# Patient Record
Sex: MALE | Race: WHITE | NOT HISPANIC OR LATINO | URBAN - METROPOLITAN AREA
[De-identification: names, ages, dates, MRNs, and addresses within clinical notes are randomized per-mention and may not be internally consistent; named-entity substitution may affect disease eponyms.]

---

## 2018-11-08 ENCOUNTER — NURSE TRIAGE (OUTPATIENT)
Dept: PHYSICAL THERAPY | Facility: OTHER | Age: 43
End: 2018-11-08

## 2018-11-08 DIAGNOSIS — M54.6 CHRONIC BILATERAL THORACIC BACK PAIN: Primary | ICD-10-CM

## 2018-11-08 DIAGNOSIS — M54.2 NECK PAIN: ICD-10-CM

## 2018-11-08 DIAGNOSIS — G89.29 CHRONIC BILATERAL THORACIC BACK PAIN: Primary | ICD-10-CM
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
## 2018-11-13 ENCOUNTER — EVALUATION (OUTPATIENT)
Dept: PHYSICAL THERAPY | Facility: CLINIC | Age: 43
End: 2018-11-13
Payer: COMMERCIAL

## 2018-11-13 DIAGNOSIS — G89.29 CHRONIC BILATERAL THORACIC BACK PAIN: Primary | ICD-10-CM

## 2018-11-13 DIAGNOSIS — M54.6 CHRONIC BILATERAL THORACIC BACK PAIN: Primary | ICD-10-CM

## 2018-11-13 DIAGNOSIS — M54.2 NECK PAIN: ICD-10-CM

## 2018-11-13 PROCEDURE — 97162 PT EVAL MOD COMPLEX 30 MIN: CPT | Performed by: PHYSICAL THERAPIST

## 2018-11-13 PROCEDURE — 97140 MANUAL THERAPY 1/> REGIONS: CPT | Performed by: PHYSICAL THERAPIST

## 2018-11-13 PROCEDURE — G8991 OTHER PT/OT GOAL STATUS: HCPCS | Performed by: PHYSICAL THERAPIST

## 2018-11-13 PROCEDURE — G8990 OTHER PT/OT CURRENT STATUS: HCPCS | Performed by: PHYSICAL THERAPIST

## 2018-11-13 RX ORDER — VENLAFAXINE HYDROCHLORIDE 150 MG/1
150 CAPSULE, EXTENDED RELEASE ORAL DAILY
COMMUNITY
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
## 2018-11-15 ENCOUNTER — OFFICE VISIT (OUTPATIENT)
Dept: PHYSICAL THERAPY | Facility: CLINIC | Age: 43
End: 2018-11-15
Payer: COMMERCIAL

## 2018-11-15 DIAGNOSIS — M54.6 CHRONIC BILATERAL THORACIC BACK PAIN: Primary | ICD-10-CM

## 2018-11-15 DIAGNOSIS — M54.2 NECK PAIN: ICD-10-CM

## 2018-11-15 DIAGNOSIS — G89.29 CHRONIC BILATERAL THORACIC BACK PAIN: Primary | ICD-10-CM

## 2018-11-15 PROCEDURE — 97140 MANUAL THERAPY 1/> REGIONS: CPT | Performed by: PHYSICAL THERAPIST

## 2018-11-15 PROCEDURE — 97110 THERAPEUTIC EXERCISES: CPT | Performed by: PHYSICAL THERAPIST

## 2018-11-15 PROCEDURE — 97010 HOT OR COLD PACKS THERAPY: CPT | Performed by: PHYSICAL THERAPIST

## 2018-11-15 NOTE — PROGRESS NOTES
Daily Note     Today's date: 11/15/2018  Patient name: Raphael Boston  : 1975  MRN: [id number]  Referring provider: Tayo Kern MD  Dx:   Encounter Diagnosis     ICD-10-CM    1  Chronic bilateral thoracic back pain M54 6     G89 29    2  Neck pain M54 2                   Subjective: Pt states no adverse effects to IE, reports compliance with HEP  Objective: See treatment diary below  Assessment: Tolerated treatment well  Good tolerance to progression of TE as noted, patient instructed to progress home program through weekend  Cont'd restriction in right scalene with significant T1/right first rib hypomobility noted  Patient would benefit from continued PT  Plan: Continue per plan of care         Precautions: anxiety    Daily Treatment Diary     Manual  11/13 11/15           R scalene STM    R first rib mobs    R C5/6 rotation mobs    R C1 shear correction 10' 25'                                                                    Exercise Diary  11/13 11/15           Thoracic ext over 1/2 pillow 2' f/b LTR :05x10 each 2' f/b LTR :05x10 each           Pivot prone NV :10x10           Foam roller series NV 1' each           t-band LT/ER NV NV           Thoracic ext stretch EOT NV NV           First ribs mobs with towel  NV                                                                                                                                                                                                     Modalities  11/13 11/15           MHP pre-tx to cervical spine NV 10'                                       HEP: thoracic ext over 1/2 pillow f/b LTR

## 2018-11-20 ENCOUNTER — OFFICE VISIT (OUTPATIENT)
Dept: PHYSICAL THERAPY | Facility: CLINIC | Age: 43
End: 2018-11-20
Payer: COMMERCIAL

## 2018-11-20 DIAGNOSIS — M54.2 NECK PAIN: ICD-10-CM

## 2018-11-20 DIAGNOSIS — M54.6 CHRONIC BILATERAL THORACIC BACK PAIN: Primary | ICD-10-CM

## 2018-11-20 DIAGNOSIS — G89.29 CHRONIC BILATERAL THORACIC BACK PAIN: Primary | ICD-10-CM

## 2018-11-20 PROCEDURE — 97110 THERAPEUTIC EXERCISES: CPT | Performed by: PHYSICAL THERAPIST

## 2018-11-20 PROCEDURE — 97010 HOT OR COLD PACKS THERAPY: CPT | Performed by: PHYSICAL THERAPIST

## 2018-11-20 PROCEDURE — 97140 MANUAL THERAPY 1/> REGIONS: CPT | Performed by: PHYSICAL THERAPIST

## 2018-11-20 NOTE — PROGRESS NOTES
Daily Note     Today's date: 2018  Patient name: Carola Mancia  : 1975  MRN: [id number]  Referring provider: Rajni Cooney MD  Dx:   Encounter Diagnosis     ICD-10-CM    1  Chronic bilateral thoracic back pain M54 6     G89 29    2  Neck pain M54 2                   Subjective: Pt states he had a few days of symptom relief following previous treatment  Reports cont'd compliance with HEP  Objective: See treatment diary below  Assessment: Tolerated treatment well  Good tolerance to progression of TE as noted, patient was given an updated written HEP this session  Cont'd restriction in right scalene with significant T1/right first rib hypomobility noted, improved following manuals  (+) response to treatment interventions  Patient would benefit from continued PT  Plan: Continue per plan of care         Precautions: anxiety    Daily Treatment Diary     Manual  11/13 11/15 11/20          R scalene STM    R first rib mobs    R C5/6 rotation mobs    R C1 shear correction 10' 25'  25'                                                                   Exercise Diary  11/13 11/15 11/20          Thoracic ext over 1/2 pillow 2' f/b LTR :05x10 each 2' f/b LTR :05x10 each 2' f/b LTR :05x10 each          Pivot prone NV :10x10 :10x10          Foam roller series NV 1' each 1' each          t-band LT/ER NV NV           Thoracic ext stretch EOT NV NV NV          First ribs mobs with towel  NV x10 flex x10 abduction          Scalene stretch with towel   :10x10 on R                                                                                                                                                                                       Modalities  11/13 11/15 11/20          MHP pre-tx to cervical spine NV 10' 10'                                      HEP: thoracic ext over 1/2 pillow f/b LTR

## 2018-11-26 ENCOUNTER — OFFICE VISIT (OUTPATIENT)
Dept: PHYSICAL THERAPY | Facility: CLINIC | Age: 43
End: 2018-11-26
Payer: COMMERCIAL

## 2018-11-26 DIAGNOSIS — M54.6 CHRONIC BILATERAL THORACIC BACK PAIN: Primary | ICD-10-CM

## 2018-11-26 DIAGNOSIS — M54.2 NECK PAIN: ICD-10-CM

## 2018-11-26 DIAGNOSIS — G89.29 CHRONIC BILATERAL THORACIC BACK PAIN: Primary | ICD-10-CM

## 2018-11-26 PROCEDURE — 97110 THERAPEUTIC EXERCISES: CPT | Performed by: PHYSICAL THERAPIST

## 2018-11-26 PROCEDURE — 97140 MANUAL THERAPY 1/> REGIONS: CPT | Performed by: PHYSICAL THERAPIST

## 2018-11-26 PROCEDURE — 97010 HOT OR COLD PACKS THERAPY: CPT | Performed by: PHYSICAL THERAPIST

## 2018-11-26 NOTE — PROGRESS NOTES
Daily Note     Today's date: 2018  Patient name: Stephanie Coreas  : 1975  MRN: [id number]  Referring provider: Lalo Noriega MD  Dx:   Encounter Diagnosis     ICD-10-CM    1  Chronic bilateral thoracic back pain M54 6     G89 29    2  Neck pain M54 2                   Subjective: Pt states he continues to notice an improvement in cervical symptoms since beginning PT  Reports cont'd compliance with HEP and reports he continues to work on his posture  Objective: See treatment diary below  Assessment: Tolerated treatment well  Cont'd restriction in right scalene with significant T1/right first rib hypomobility noted, improved following manuals and gradually improved overall  Good tolerance to ex program, min cueing for proper technique  (+) response to treatment interventions  Patient would benefit from continued PT  Plan: Continue per plan of care         Precautions: anxiety    Daily Treatment Diary     Manual  11/13 11/15 11/20 11/26         R scalene STM    R first rib mobs    R C5/6 rotation mobs    R C1 shear correction 10' 25'  25'  25'                                                                 Exercise Diary  11/13 11/15 11/20 11/26         Thoracic ext over 1/2 pillow 2' f/b LTR :05x10 each 2' f/b LTR :05x10 each 2' f/b LTR :05x10 each 2' f/b LTR :05x10 each         Pivot prone NV :10x10 :10x10 :10x10         Foam roller series NV 1' each 1' each 1' each         t-band LT/ER NV NV  NV         Thoracic ext stretch EOT NV NV NV NV         First ribs mobs with towel  NV x10 flex x10 abduction x10 flex x10 abduction         Scalene stretch with towel   :10x10 on R :10x10 on R                                                                                                                                                                                      Modalities  11/13 11/15 11/20 11/26         MHP pre-tx to cervical spine NV 10' 10' 10' HEP: thoracic ext over 1/2 pillow f/b LTR

## 2018-11-29 ENCOUNTER — OFFICE VISIT (OUTPATIENT)
Dept: PHYSICAL THERAPY | Facility: CLINIC | Age: 43
End: 2018-11-29
Payer: COMMERCIAL

## 2018-11-29 DIAGNOSIS — M54.6 CHRONIC BILATERAL THORACIC BACK PAIN: Primary | ICD-10-CM

## 2018-11-29 DIAGNOSIS — G89.29 CHRONIC BILATERAL THORACIC BACK PAIN: Primary | ICD-10-CM

## 2018-11-29 DIAGNOSIS — M54.2 NECK PAIN: ICD-10-CM

## 2018-11-29 PROCEDURE — 97140 MANUAL THERAPY 1/> REGIONS: CPT | Performed by: PHYSICAL THERAPIST

## 2018-11-29 PROCEDURE — 97110 THERAPEUTIC EXERCISES: CPT | Performed by: PHYSICAL THERAPIST

## 2018-11-29 PROCEDURE — 97010 HOT OR COLD PACKS THERAPY: CPT | Performed by: PHYSICAL THERAPIST

## 2018-11-29 NOTE — PROGRESS NOTES
Daily Note     Today's date: 2018  Patient name: Lacey Jerez  : 1975  MRN: [id number]  Referring provider: William Wilkes MD  Dx:   Encounter Diagnosis     ICD-10-CM    1  Chronic bilateral thoracic back pain M54 6     G89 29    2  Neck pain M54 2                   Subjective: Pt states he attempted new sitting posture to improved tolerance  Continues to notice an improvement in cervical symptoms since beginning PT, reporting he felt around 80% improved following the first week but notices as he works throughout the day his symptoms continue to fluctuate  Objective: See treatment diary below  Assessment: Tolerated treatment well  Cont'd restriction in right scalene with significant T1/right first rib hypomobility noted, improved following manuals and gradually improved overall  Progressed TE this session to include postural strengthening, patient was given an updated written HEP and green t-band this session  (+) response to treatment interventions  Patient would benefit from continued PT  Plan: Continue per plan of care     Pt seeing chiropractor and massage therapist on 12/3/18     Precautions: anxiety    Daily Treatment Diary     Manual  11/13 11/15 11/20 11/26 11/29        R scalene STM    R first rib mobs    R C5/6 rotation mobs    R C1 shear correction 10' 25'  25'  25' 15'                                                                 Exercise Diary  11/13 11/15 11/20 11/26 11/29        Thoracic ext over 1/2 pillow 2' f/b LTR :05x10 each 2' f/b LTR :05x10 each 2' f/b LTR :05x10 each 2' f/b LTR :05x10 each 2' f/b LTR :05x10 each        Pivot prone NV :10x10 :10x10 :10x10 :10x10        Foam roller series NV 1' each 1' each 1' each 1' each        t-band rows     GTB :10x10        t-band LT/ER NV NV  NV GTB :10x10 each        Thoracic ext stretch EOT NV NV NV NV NV        First ribs mobs with towel  NV x10 flex x10 abduction x10 flex x10 abduction home        Scalene stretch with towel   :10x10 on R :10x10 on R home                                                                                                                                                                                     Modalities  11/13 11/15 11/20 11/26 11/29        MHP pre-tx to cervical spine NV 10' 10' 10' 10'                                    HEP: thoracic ext over 1/2 pillow f/b LTR

## 2018-12-03 ENCOUNTER — APPOINTMENT (OUTPATIENT)
Dept: PHYSICAL THERAPY | Facility: CLINIC | Age: 43
End: 2018-12-03
Payer: COMMERCIAL

## 2018-12-04 ENCOUNTER — OFFICE VISIT (OUTPATIENT)
Dept: PHYSICAL THERAPY | Facility: CLINIC | Age: 43
End: 2018-12-04
Payer: COMMERCIAL

## 2018-12-04 DIAGNOSIS — G89.29 CHRONIC BILATERAL THORACIC BACK PAIN: Primary | ICD-10-CM

## 2018-12-04 DIAGNOSIS — M54.2 NECK PAIN: ICD-10-CM

## 2018-12-04 DIAGNOSIS — M54.6 CHRONIC BILATERAL THORACIC BACK PAIN: Primary | ICD-10-CM

## 2018-12-04 PROCEDURE — 97010 HOT OR COLD PACKS THERAPY: CPT | Performed by: PHYSICAL THERAPIST

## 2018-12-04 PROCEDURE — 97110 THERAPEUTIC EXERCISES: CPT | Performed by: PHYSICAL THERAPIST

## 2018-12-04 PROCEDURE — 97140 MANUAL THERAPY 1/> REGIONS: CPT | Performed by: PHYSICAL THERAPIST

## 2018-12-04 NOTE — PROGRESS NOTES
Daily Note     Today's date: 2018  Patient name: Flora Martinez  : 1975  MRN: [id number]  Referring provider: Sarah Barth MD  Dx:   Encounter Diagnosis     ICD-10-CM    1  Chronic bilateral thoracic back pain M54 6     G89 29    2  Neck pain M54 2                   Subjective: Pt states improved tolerance to prolonged sitting with less symptoms present  He reports continued compliance with HEP  Objective: See treatment diary below  Assessment: Tolerated treatment well  Reducing restriction in right scalene and T1/right first rib hypomobility  Good tolerance to ex program as noted  (+) response to treatment interventions  Patient would benefit from continued PT  Plan: Continue per plan of care         Precautions: anxiety    Daily Treatment Diary     Manual  11/13 11/15 11/20 11/26 11/29 12/4       R scalene STM    R first rib mobs    R C5/6 rotation mobs    R C1 shear correction 10' 25'  25'  25' 15'  15'                                                               Exercise Diary  11/13 11/15 11/20 11/26 11/29 12/4       Thoracic ext over 1/2 pillow 2' f/b LTR :05x10 each 2' f/b LTR :05x10 each 2' f/b LTR :05x10 each 2' f/b LTR :05x10 each 2' f/b LTR :05x10 each 2' f/b LTR :05x10 each       Pivot prone NV :10x10 :10x10 :10x10 :10x10 :10x10       Foam roller series NV 1' each 1' each 1' each 1' each 1' each       t-band rows     GTB :10x10 GTB :10x10       t-band LT/ER NV NV  NV GTB :10x10 each GTB :10x10 each       Thoracic ext stretch EOT NV NV NV NV NV NV       First ribs mobs with towel  NV x10 flex x10 abduction x10 flex x10 abduction home home       Scalene stretch with towel   :10x10 on R :10x10 on R home home                                                                                                                                                                                    Modalities  11/13 11/15 11/20 11/26 11/29 12/4       MHP pre-tx to cervical spine NV 10' 10' 10' 10' 10'                                   HEP: thoracic ext over 1/2 pillow f/b LTR

## 2018-12-06 ENCOUNTER — OFFICE VISIT (OUTPATIENT)
Dept: PHYSICAL THERAPY | Facility: CLINIC | Age: 43
End: 2018-12-06
Payer: COMMERCIAL

## 2018-12-06 DIAGNOSIS — M54.6 CHRONIC BILATERAL THORACIC BACK PAIN: Primary | ICD-10-CM

## 2018-12-06 DIAGNOSIS — M54.2 NECK PAIN: ICD-10-CM

## 2018-12-06 DIAGNOSIS — G89.29 CHRONIC BILATERAL THORACIC BACK PAIN: Primary | ICD-10-CM

## 2018-12-06 PROCEDURE — 97140 MANUAL THERAPY 1/> REGIONS: CPT | Performed by: PHYSICAL THERAPIST

## 2018-12-06 PROCEDURE — 97110 THERAPEUTIC EXERCISES: CPT | Performed by: PHYSICAL THERAPIST

## 2018-12-06 PROCEDURE — 97010 HOT OR COLD PACKS THERAPY: CPT | Performed by: PHYSICAL THERAPIST

## 2018-12-06 NOTE — PROGRESS NOTES
Daily Note     Today's date: 2018  Patient name: Georgie Guerrero  : 1975  MRN: [id number]  Referring provider: Malinda Mckeon MD  Dx:   Encounter Diagnosis     ICD-10-CM    1  Chronic bilateral thoracic back pain M54 6     G89 29    2  Neck pain M54 2                   Subjective: Pt states no new complaints at pre-tx, reporting he continues to comply with HEP  States he continues to have "good and bad days "       Objective: See treatment diary below  Assessment: Tolerated treatment well  Reducing restriction in right scalene and T1/right first rib hypomobility  Good tolerance to ex program, continued mild fatigue with postural strengthening  (+) response to treatment interventions  Patient would benefit from continued PT  Plan: Continue per plan of care         Precautions: anxiety    Daily Treatment Diary     Manual  11/13 11/15 11/20 11/26 11/29 12/4 12/6      R scalene STM    R first rib mobs    R C5/6 rotation mobs    R C1 shear correction 10' 25'  25'  25' 15'  15' 15'                                                              Exercise Diary  11/13 11/15 11/20 11/26 11/29 12/4 12/6      Thoracic ext over 12 pillow 2' f/b LTR :05x10 each 2' f/b LTR :05x10 each 2' f/b LTR :05x10 each 2' f/b LTR :05x10 each 2' f/b LTR :05x10 each 2' f/b LTR :05x10 each 2' f/b LTR :05x10 each      Pivot prone NV :10x10 :10x10 :10x10 :10x10 :10x10 :10x10      Foam roller series NV 1' each 1' each 1' each 1' each 1' each 1' each      t-band rows     GTB :10x10 GTB :10x10 GTB :10x10      t-band LT/ER NV NV  NV GTB :10x10 each GTB :10x10 each GTB :10x10 each      Thoracic ext stretch EOT NV NV NV NV NV NV NV      First ribs mobs with towel  NV x10 flex x10 abduction x10 flex x10 abduction home home home      Scalene stretch with towel   :10x10 on R :10x10 on R home home home Modalities  11/13 11/15 11/20 11/26 11/29 12/4 12/6      MHP pre-tx to cervical spine NV 10' 10' 10' 10' 10' 10'                                  HEP: thoracic ext over 1/2 pillow f/b LTR

## 2018-12-12 ENCOUNTER — APPOINTMENT (OUTPATIENT)
Dept: PHYSICAL THERAPY | Facility: CLINIC | Age: 43
End: 2018-12-12
Payer: COMMERCIAL

## 2018-12-13 ENCOUNTER — OFFICE VISIT (OUTPATIENT)
Dept: PHYSICAL THERAPY | Facility: CLINIC | Age: 43
End: 2018-12-13
Payer: COMMERCIAL

## 2018-12-13 DIAGNOSIS — M54.2 NECK PAIN: ICD-10-CM

## 2018-12-13 DIAGNOSIS — M54.6 CHRONIC BILATERAL THORACIC BACK PAIN: Primary | ICD-10-CM

## 2018-12-13 DIAGNOSIS — G89.29 CHRONIC BILATERAL THORACIC BACK PAIN: Primary | ICD-10-CM

## 2018-12-13 PROCEDURE — 97110 THERAPEUTIC EXERCISES: CPT | Performed by: PHYSICAL THERAPIST

## 2018-12-13 PROCEDURE — 97010 HOT OR COLD PACKS THERAPY: CPT | Performed by: PHYSICAL THERAPIST

## 2018-12-13 PROCEDURE — 97140 MANUAL THERAPY 1/> REGIONS: CPT | Performed by: PHYSICAL THERAPIST

## 2018-12-13 NOTE — PROGRESS NOTES
Daily Note     Today's date: 2018  Patient name: Cali Spence  : 1975  MRN: [id number]  Referring provider: John Cohn MD  Dx:   Encounter Diagnosis     ICD-10-CM    1  Chronic bilateral thoracic back pain M54 6     G89 29    2  Neck pain M54 2                   Subjective: Pt states no new complaints at pre-tx  Objective: See treatment diary below  Assessment: Tolerated treatment well  Reducing restriction in right scalene and T1/right first rib hypomobility  Good tolerance to ex program and progression  Patient would benefit from continued PT  Plan: Continue per plan of care         Precautions: anxiety    Daily Treatment Diary     Manual  11/13 11/15 11/20 11/26 11/29 12/4 12/6 12/13     R scalene STM    R first/second rib mobs    R C5/6 rotation mobs    R C1 shear correction 10' 25'  25'  25' 15'  15' 15' 15'                                                             Exercise Diary  11/13 11/15 11/20 11/26 11/29 12/4 12/6 12/13     Thoracic ext over 1/2 pillow 2' f/b LTR :05x10 each 2' f/b LTR :05x10 each 2' f/b LTR :05x10 each 2' f/b LTR :05x10 each 2' f/b LTR :05x10 each 2' f/b LTR :05x10 each 2' f/b LTR :05x10 each 2' f/b LTR :05x10 each     Pivot prone NV :10x10 :10x10 :10x10 :10x10 :10x10 :10x10 :10x10     Foam roller series NV 1' each 1' each 1' each 1' each 1' each 1' each 1' each     t-band rows     GTB :10x10 GTB :10x10 GTB :10x10 GTB :10x10     t-band LT/ER NV NV  NV GTB :10x10 each GTB :10x10 each GTB :10x10 each GTB :10x10 each     Thoracic ext stretch EOT NV NV NV NV NV NV NV :10x10     First ribs mobs with towel  NV x10 flex x10 abduction x10 flex x10 abduction home home home home     Scalene stretch with towel   :10x10 on R :10x10 on R home home home home                                                                                                                                                                                  Modalities  11/13 11/15 11/20 11/26 11/29 12/4 12/6 12/13     MHP pre-tx to cervical spine NV 10' 10' 10' 10' 10' 10' 10'                                 HEP: thoracic ext over 1/2 pillow f/b LTR

## 2018-12-19 ENCOUNTER — OFFICE VISIT (OUTPATIENT)
Dept: PHYSICAL THERAPY | Facility: CLINIC | Age: 43
End: 2018-12-19
Payer: COMMERCIAL

## 2018-12-19 DIAGNOSIS — M54.6 CHRONIC BILATERAL THORACIC BACK PAIN: Primary | ICD-10-CM

## 2018-12-19 DIAGNOSIS — G89.29 CHRONIC BILATERAL THORACIC BACK PAIN: Primary | ICD-10-CM

## 2018-12-19 DIAGNOSIS — M54.2 NECK PAIN: ICD-10-CM

## 2018-12-19 PROCEDURE — 97010 HOT OR COLD PACKS THERAPY: CPT | Performed by: PHYSICAL THERAPIST

## 2018-12-19 PROCEDURE — 97140 MANUAL THERAPY 1/> REGIONS: CPT | Performed by: PHYSICAL THERAPIST

## 2018-12-19 PROCEDURE — 97110 THERAPEUTIC EXERCISES: CPT | Performed by: PHYSICAL THERAPIST

## 2018-12-19 NOTE — PROGRESS NOTES
Daily Note     Today's date: 2018  Patient name: Candace Stein  : 1975  MRN: [id number]  Referring provider: Ade Trotter MD  Dx:   Encounter Diagnosis     ICD-10-CM    1  Chronic bilateral thoracic back pain M54 6     G89 29    2  Neck pain M54 2                   Subjective: Pt states symptoms continue to fluctuate, reporting he notices a change of workstation at work reduced symptoms due to better ergonomic set up  Objective: See treatment diary below  Assessment: Tolerated treatment well  Reducing restriction in right scalene  Continued T1/right first/second rib hypomobility  Good tolerance to ex program with no symptoms present post-tx  Patient would benefit from continued PT  Plan: Continue per plan of care         Precautions: anxiety    Daily Treatment Diary     Manual  11/13 11/15 11/20 11/26 11/29 12/4 12/6 12/13 12/19    R scalene STM    R first/second rib mobs    R C5/6 rotation mobs    R C1 shear correction 10' 25'  25'  25' 15'  15' 15' 15' 15'                                                             Exercise Diary  11/13 11/15 11/20 11/26 11/29 12/4 12/6 12/13 12/19    Thoracic ext over 1/2 pillow 2' f/b LTR :05x10 each 2' f/b LTR :05x10 each 2' f/b LTR :05x10 each 2' f/b LTR :05x10 each 2' f/b LTR :05x10 each 2' f/b LTR :05x10 each 2' f/b LTR :05x10 each 2' f/b LTR :05x10 each 3' f/b LTR :05x10 each    Pivot prone NV :10x10 :10x10 :10x10 :10x10 :10x10 :10x10 :10x10 :10x10    Foam roller series NV 1' each 1' each 1' each 1' each 1' each 1' each 1' each 1' each    t-band rows     GTB :10x10 GTB :10x10 GTB :10x10 GTB :10x10 GTB :10x10    t-band LT/ER NV NV  NV GTB :10x10 each GTB :10x10 each GTB :10x10 each GTB :10x10 each GTB :10x10 each    Thoracic ext stretch EOT NV NV NV NV NV NV NV :10x10 :10x10    First ribs mobs with towel  NV x10 flex x10 abduction x10 flex x10 abduction home home home home home    Scalene stretch with towel   :10x10 on R :10x10 on R home home home home home                                                                                                                                                                                 Modalities  11/13 11/15 11/20 11/26 11/29 12/4 12/6 12/13 12/19    MHP pre-tx to cervical spine NV 10' 10' 10' 10' 10' 10' 10' 10'                                 HEP: thoracic ext over 1/2 pillow f/b LTR

## 2018-12-26 ENCOUNTER — OFFICE VISIT (OUTPATIENT)
Dept: PHYSICAL THERAPY | Facility: CLINIC | Age: 43
End: 2018-12-26
Payer: COMMERCIAL

## 2018-12-26 DIAGNOSIS — M54.6 CHRONIC BILATERAL THORACIC BACK PAIN: Primary | ICD-10-CM

## 2018-12-26 DIAGNOSIS — M54.2 NECK PAIN: ICD-10-CM

## 2018-12-26 DIAGNOSIS — G89.29 CHRONIC BILATERAL THORACIC BACK PAIN: Primary | ICD-10-CM

## 2018-12-26 PROCEDURE — 97110 THERAPEUTIC EXERCISES: CPT | Performed by: PHYSICAL THERAPIST

## 2018-12-26 PROCEDURE — 97010 HOT OR COLD PACKS THERAPY: CPT | Performed by: PHYSICAL THERAPIST

## 2018-12-26 PROCEDURE — 97140 MANUAL THERAPY 1/> REGIONS: CPT | Performed by: PHYSICAL THERAPIST

## 2018-12-26 NOTE — PROGRESS NOTES
Daily Note     Today's date: 2018  Patient name: Liborio Kirby  : 1975  MRN: [id number]  Referring provider: Juan Jose Mitchell MD  Dx:   Encounter Diagnosis     ICD-10-CM    1  Chronic bilateral thoracic back pain M54 6     G89 29    2  Neck pain M54 2                   Subjective: Pt states a 50-60% improvement in symptoms since beginning PT, stating he has had less symptoms this week due to being off from work  Reports symptoms continue to "fluctuate", localized to left scapular region  Objective: See treatment diary below  Assessment: Tolerated treatment well  Progressed MT and TE as noted to address continued mid thoracic spine pain to good tolerance, patient demonstrating fatigue with prone scap stabilization exercises  Continued T1/right first/second rib hypomobility  Reduced symptoms noted post-tx  Patient would benefit from continued PT  Plan: Continue per plan of care         Precautions: anxiety    Daily Treatment Diary     Manual  11/13 11/15 11/20 11/26 11/29 12/4 12/6 12/13 12/19 12/26   R scalene STM    R first/second rib mobs    R C5/6 rotation mobs    R C1 shear correction    Prone thoracic PA's to L T6-7/rhomboid 10' 25'  25'  25' 15'  15' 15' 15' 15'  15'                                                           Exercise Diary  11/13 11/15 11/20 11/26 11/29 12/4 12/6 12/13 12/19 12/26   Thoracic ext over 1/ pillow 2' f/b LTR :05x10 each 2' f/b LTR :05x10 each 2' f/b LTR :05x10 each 2' f/b LTR :05x10 each 2' f/b LTR :05x10 each 2' f/b LTR :05x10 each 2' f/b LTR :05x10 each 2' f/b LTR :05x10 each 3' f/b LTR :05x10 each 3' f/b LTR :05x10 each (used foam roll this visit)   Pivot prone NV :10x10 :10x10 :10x10 :10x10 :10x10 :10x10 :10x10 :10x10 NP   Foam roller series NV 1' each 1' each 1' each 1' each 1' each 1' each 1' each 1' each NP   t-band rows     GTB :10x10 GTB :10x10 GTB :10x10 GTB :10x10 GTB :10x10 Held    t-band LT/ER NV NV  NV GTB :10x10 each GTB :10x10 each GTB :10x10 each GTB :10x10 each GTB :10x10 each Held   Prone I's, T's, W's          :[id number]   Thoracic ext stretch EOT NV NV NV NV NV NV NV :10x10 :10x10 NP   First ribs mobs with towel  NV x10 flex x10 abduction x10 flex x10 abduction home home home home home home   Scalene stretch with towel   :10x10 on R :10x10 on R home home home home home home   Self ball massage          5' (home NV)                                                                                                                                                                   Modalities  11/13 11/15 11/20 11/26 11/29 12/4 12/6 12/13 12/19 12/26   MHP pre-tx to cervical spine NV 10' 10' 10' 10' 10' 10' 10' 10'  10'                               HEP: thoracic ext over 1/2 pillow f/b LTR

## 2019-01-04 ENCOUNTER — OFFICE VISIT (OUTPATIENT)
Dept: PHYSICAL THERAPY | Facility: CLINIC | Age: 44
End: 2019-01-04
Payer: COMMERCIAL

## 2019-01-04 DIAGNOSIS — G89.29 CHRONIC BILATERAL THORACIC BACK PAIN: Primary | ICD-10-CM

## 2019-01-04 DIAGNOSIS — M54.2 NECK PAIN: ICD-10-CM

## 2019-01-04 DIAGNOSIS — M54.6 CHRONIC BILATERAL THORACIC BACK PAIN: Primary | ICD-10-CM

## 2019-01-04 PROCEDURE — 97010 HOT OR COLD PACKS THERAPY: CPT | Performed by: PHYSICAL THERAPIST

## 2019-01-04 PROCEDURE — 97140 MANUAL THERAPY 1/> REGIONS: CPT | Performed by: PHYSICAL THERAPIST

## 2019-01-04 PROCEDURE — 97110 THERAPEUTIC EXERCISES: CPT | Performed by: PHYSICAL THERAPIST

## 2019-01-04 NOTE — PROGRESS NOTES
Daily Note     Today's date: 2019  Patient name: Humphrey Romeo  : 1975  MRN: [id number]  Referring provider: Collette Major MD  Dx:   Encounter Diagnosis     ICD-10-CM    1  Chronic bilateral thoracic back pain M54 6     G89 29    2  Neck pain M54 2                   Subjective: Pt states continued intermittent symptoms over the last week or so  He reports he ordered a massage ball and foam roller off [employer] to perform exercises at home and noticed an improvement  Objective: See treatment diary below  Assessment: Tolerated treatment well  Good tolerance to MT addressing right sided cervical restrictions and left sided thoracic restrictions  Good tolerance to ex program as noted, requiring verbal cues for proper technique with scap stabilization ex  Reduced symptoms post-tx  Patient would benefit from continued PT  Plan: Continue per plan of care         Precautions: anxiety    Daily Treatment Diary     Manual  1/4 11/15 11/20 11/26 11/29 12/4 12/6 12/13 12/19 12/26   R scalene STM    R first/second rib mobs    R C5/6 rotation mobs    R C1 shear correction    Prone thoracic PA's to L T6-7/rhomboid 15' 25'  25'  25' 15'  15' 15' 15' 15'  15'                                                           Exercise Diary  1/4 11/15 11/20 11/26 11/29 12/4 12/6 12/13 12/19 12/26   Thoracic ext over  pillow 3' f/b LTR :05x10 each (used foam roll this visit) 2' f/b LTR :05x10 each 2' f/b LTR :05x10 each 2' f/b LTR :05x10 each 2' f/b LTR :05x10 each 2' f/b LTR :05x10 each 2' f/b LTR :05x10 each 2' f/b LTR :05x10 each 3' f/b LTR :05x10 each 3' f/b LTR :05x10 each (used foam roll this visit)   Pivot prone NP :10x10 :10x10 :10x10 :10x10 :10x10 :10x10 :10x10 :10x10 NP   Foam roller series 1' each 1' each 1' each 1' each 1' each 1' each 1' each 1' each 1' each NP   t-band rows held    GTB :10x10 GTB :10x10 GTB :10x10 GTB :10x10 GTB :10x10 Held    t-band LT/ER held NV  NV GTB :10x10 each GTB :10x10 each GTB :10x10 each GTB :10x10 each GTB :10x10 each Held   Prone I's, T's, W's :10x10 each         [id number]   Thoracic ext stretch EOT :10x10 NV NV NV NV NV NV :10x10 :10x10 NP   First ribs mobs with towel home NV x10 flex x10 abduction x10 flex x10 abduction home home home home home home   Scalene stretch with towel home  :10x10 on R :10x10 on R home home home home home home   Self ball massage home         5' (home NV)                                                                                                                                                                   Modalities  1/4 11/15 11/20 11/26 11/29 12/4 12/6 12/13 12/19 12/26   MHP pre-tx to cervical spine 10' 10' 10' 10' 10' 10' 10' 10' 10'  10'                               HEP: thoracic ext over 1/2 pillow f/b LTR

## 2019-01-09 ENCOUNTER — OFFICE VISIT (OUTPATIENT)
Dept: PHYSICAL THERAPY | Facility: CLINIC | Age: 44
End: 2019-01-09
Payer: COMMERCIAL

## 2019-01-09 DIAGNOSIS — G89.29 CHRONIC BILATERAL THORACIC BACK PAIN: Primary | ICD-10-CM

## 2019-01-09 DIAGNOSIS — M54.2 NECK PAIN: ICD-10-CM

## 2019-01-09 DIAGNOSIS — M54.6 CHRONIC BILATERAL THORACIC BACK PAIN: Primary | ICD-10-CM

## 2019-01-09 PROCEDURE — 97110 THERAPEUTIC EXERCISES: CPT | Performed by: PHYSICAL THERAPIST

## 2019-01-09 PROCEDURE — 97140 MANUAL THERAPY 1/> REGIONS: CPT | Performed by: PHYSICAL THERAPIST

## 2019-01-09 PROCEDURE — 97010 HOT OR COLD PACKS THERAPY: CPT | Performed by: PHYSICAL THERAPIST

## 2019-01-09 NOTE — PROGRESS NOTES
Daily Note     Today's date: 2019  Patient name: Yadi Graf  : 1975  MRN: [id number]  Referring provider: Natalie Hebert MD  Dx:   Encounter Diagnosis     ICD-10-CM    1  Chronic bilateral thoracic back pain M54 6     G89 29    2  Neck pain M54 2                   Subjective: Pt states he continues to have improvements in symptoms since beginning PT and has stayed compliant with HEP, trying to be more mindful of posturing  He reports work-related activity continues to aggravate symptoms  Objective: See treatment diary below  Assessment: Tolerated treatment well  Good tolerance to MT addressing right sided cervical restrictions and left sided thoracic restrictions  Cont'd hypomobility of right lower cervical vertebra  Good tolerance to ex program as noted, requiring verbal cues for proper technique with scap stabilization ex  Reduced symptoms reported post-tx  Patient would benefit from continued PT  Plan: Continue per plan of care         Precautions: anxiety    Daily Treatment Diary     Manual             R scalene STM    R first/second rib mobs    R C5/6 rotation mobs    R C1 shear correction    Prone thoracic PA's to L T6-7/rhomboid 15' 15'                                                                    Exercise Diary             Thoracic ext over 2 pillow 3' f/b LTR :05x10 each (used foam roll this visit) 3' f/b LTR :05x10 each (used foam roll this visit)           Pivot prone NP :10x10           Foam roller series 1' each 1' each           t-band rows held held           t-band LT/ER held held           Prone I's, T's, W's :10x10 each :10x10 each           Thoracic ext stretch EOT :10x10 :10x10           First ribs mobs with towel home home           Scalene stretch with towel home home           Self ball massage home home Modalities  1/4 1/9           MHP pre-tx to cervical spine 10' 10'                                       HEP: thoracic ext over 1/2 pillow f/b LTR

## 2019-01-16 ENCOUNTER — OFFICE VISIT (OUTPATIENT)
Dept: PHYSICAL THERAPY | Facility: CLINIC | Age: 44
End: 2019-01-16
Payer: COMMERCIAL

## 2019-01-16 DIAGNOSIS — M54.6 CHRONIC BILATERAL THORACIC BACK PAIN: Primary | ICD-10-CM

## 2019-01-16 DIAGNOSIS — M54.2 NECK PAIN: ICD-10-CM

## 2019-01-16 DIAGNOSIS — G89.29 CHRONIC BILATERAL THORACIC BACK PAIN: Primary | ICD-10-CM

## 2019-01-16 PROCEDURE — 97140 MANUAL THERAPY 1/> REGIONS: CPT | Performed by: PHYSICAL THERAPIST

## 2019-01-16 PROCEDURE — 97110 THERAPEUTIC EXERCISES: CPT | Performed by: PHYSICAL THERAPIST

## 2019-01-16 PROCEDURE — 97010 HOT OR COLD PACKS THERAPY: CPT | Performed by: PHYSICAL THERAPIST

## 2019-01-16 NOTE — PROGRESS NOTES
Daily Note     Today's date: 2019  Patient name: Minh Alberto  : 1975  MRN: [id number]  Referring provider: Lata Maloney MD  Dx:   Encounter Diagnosis     ICD-10-CM    1  Chronic bilateral thoracic back pain M54 6     G89 29    2  Neck pain M54 2                   Subjective: Pt states continued left sided cervical discomfort with prolonged sitting at work, improved since beginning PT  Objective: See treatment diary below  Assessment: Tolerated treatment well  Good tolerance to MT, cont'd hypomobility of right lower cervical vertebra and upper thoracic  Good tolerance to ex program focused on improving postural strength and spinal mobility  Reduced symptoms reported post-tx  Patient would benefit from continued PT  Plan: Continue per plan of care         Precautions: anxiety    Daily Treatment Diary     Manual            R scalene STM    R first/second rib mobs    R C5/6 rotation mobs    R C1 shear correction    Prone thoracic PA's to L T6-7/rhomboid 15' 15'  25'                                                                  Exercise Diary            Thoracic ext over 1/2 pillow 3' f/b LTR :05x10 each (used foam roll this visit) 3' f/b LTR :05x10 each (used foam roll this visit) 3' f/b LTR :05x10 each (used foam roll this visit)          Pivot prone NP :10x10 :10x10          Foam roller series 1' each 1' each 1' each          t-band rows held held held          t-band LT/ER held held held          Prone I's, T's, W's :10x10 each :10x10 each :10x10 each          Thoracic ext stretch EOT :10x10 :10x10 :10x10          First ribs mobs with towel home home home          Scalene stretch with towel home home home          Self ball massage home home home                                                                                                                                                                          Modalities            P pre-tx to cervical spine 10' 10' 10'                                      HEP: thoracic ext over 1/2 pillow f/b LTR

## 2019-01-23 ENCOUNTER — OFFICE VISIT (OUTPATIENT)
Dept: PHYSICAL THERAPY | Facility: CLINIC | Age: 44
End: 2019-01-23
Payer: COMMERCIAL

## 2019-01-23 DIAGNOSIS — M54.2 NECK PAIN: ICD-10-CM

## 2019-01-23 DIAGNOSIS — M54.6 CHRONIC BILATERAL THORACIC BACK PAIN: Primary | ICD-10-CM

## 2019-01-23 DIAGNOSIS — G89.29 CHRONIC BILATERAL THORACIC BACK PAIN: Primary | ICD-10-CM

## 2019-01-23 PROCEDURE — 97110 THERAPEUTIC EXERCISES: CPT | Performed by: PHYSICAL THERAPIST

## 2019-01-23 PROCEDURE — 97010 HOT OR COLD PACKS THERAPY: CPT | Performed by: PHYSICAL THERAPIST

## 2019-01-23 PROCEDURE — 97140 MANUAL THERAPY 1/> REGIONS: CPT | Performed by: PHYSICAL THERAPIST

## 2019-01-23 NOTE — PROGRESS NOTES
Daily Note     Today's date: 2019  Patient name: Kourtney aDmon  : 1975  MRN: [id number]  Referring provider: Maryana Samuels MD  Dx:   Encounter Diagnosis     ICD-10-CM    1  Chronic bilateral thoracic back pain M54 6     G89 29    2  Neck pain M54 2                   Subjective: Pt states work continues to exacerbate left sided upper back/lower cervical discomfort, however he remains complaint to HEP at end of work day and states exercises reduce symptoms  He states less pain with prolonged sitting and notices decreased frequency/intensity of pain  Objective: See treatment diary below  Assessment: Tolerated treatment well  Good tolerance to MT, improving mobility of right lower cervical vertebra and upper thoracic  Good tolerance to ex program focused on improving postural strength and spinal mobility  Reduced symptoms reported post-tx  Patient would benefit from continued PT  Plan: Continue per plan of care  Plan of care discussed with patient, secondary to improvements and independence with HEP, recommended patient continue PT next week with possible transition to HEP at that time         Precautions: anxiety    Daily Treatment Diary     Manual           R scalene STM    R first/second rib mobs    R C5/6 rotation mobs    R C1 shear correction    Prone thoracic PA's to L T6-7/rhomboid 15' 15'  25' 25'                                                                 Exercise Diary           Thoracic ext over 12 pillow 3' f/b LTR :05x10 each (used foam roll this visit) 3' f/b LTR :05x10 each (used foam roll this visit) 3' f/b LTR :05x10 each (used foam roll this visit) 3' f/b LTR :05x10 each (used foam roll this visit)         Pivot prone NP :10x10 :10x10 :10x10         Foam roller series 1' each 1' each 1' each 1' each         t-band rows held held held held         t-band LT/ER held held held held         Prone I's, T's, W's :10x10 each :10x10 each :10x10 each :10x10 each         Thoracic ext stretch EOT :10x10 :10x10 :10x10 :10x10         First ribs mobs with towel home home home home         Scalene stretch with towel home home home home         Self ball massage home home home home                                                                                                                                                                         Modalities  1/4 1/9 1/16 1/23         MHP pre-tx to cervical spine 10' 10' 10' 10'                                     HEP: thoracic ext over 1/2 pillow f/b LTR

## 2019-01-30 ENCOUNTER — OFFICE VISIT (OUTPATIENT)
Dept: PHYSICAL THERAPY | Facility: CLINIC | Age: 44
End: 2019-01-30
Payer: COMMERCIAL

## 2019-01-30 DIAGNOSIS — M54.2 NECK PAIN: ICD-10-CM

## 2019-01-30 DIAGNOSIS — M54.6 CHRONIC BILATERAL THORACIC BACK PAIN: Primary | ICD-10-CM

## 2019-01-30 DIAGNOSIS — G89.29 CHRONIC BILATERAL THORACIC BACK PAIN: Primary | ICD-10-CM

## 2019-01-30 PROCEDURE — 97140 MANUAL THERAPY 1/> REGIONS: CPT | Performed by: PHYSICAL THERAPIST

## 2019-01-30 PROCEDURE — 97010 HOT OR COLD PACKS THERAPY: CPT | Performed by: PHYSICAL THERAPIST

## 2019-01-30 PROCEDURE — G8991 OTHER PT/OT GOAL STATUS: HCPCS | Performed by: PHYSICAL THERAPIST

## 2019-01-30 PROCEDURE — 97110 THERAPEUTIC EXERCISES: CPT | Performed by: PHYSICAL THERAPIST

## 2019-01-30 PROCEDURE — G8992 OTHER PT/OT  D/C STATUS: HCPCS | Performed by: PHYSICAL THERAPIST

## 2019-01-30 NOTE — PROGRESS NOTES
Daily Note / Discharge Summary    Today's date: 2019  Patient name: Stephanie Coreas  : 1975  MRN: [id number]  Referring provider: Lalo Noriega MD  Dx:   Encounter Diagnosis     ICD-10-CM    1  Chronic bilateral thoracic back pain M54 6     G89 29    2  Neck pain M54 2                   Subjective: Pt has been seen for 15 visits since beginning PT on 19 and states a 65-70% improvement overall  FOTO score improved from 74 at IE to 99 at time of discharge  Pt states he is compliant with HEP and has less pain with prolonged sitting at work, noticing decreased frequency/intensity of pain  Objective: See treatment diary below  Assessment: Tolerated treatment well  Responded well to treatment interventions  Plan: Plan of care discussed with patient, secondary to improvements and independence with HEP, recommended patient be discharged from skilled PT and transitioned to HEP         Precautions: anxiety    Daily Treatment Diary     Manual          R scalene STM    R first/second rib mobs    R C5/6 rotation mobs    R C1 shear correction    Prone thoracic PA's to L T6-7/rhomboid 15' 15'  25' 25' 25'                                                                Exercise Diary          Thoracic ext over /2 pillow 3' f/b LTR :05x10 each (used foam roll this visit) 3' f/b LTR :05x10 each (used foam roll this visit) 3' f/b LTR :05x10 each (used foam roll this visit) 3' f/b LTR :05x10 each (used foam roll this visit) 3' f/b LTR :05x10 each (used foam roll this visit)        Pivot prone NP :10x10 :10x10 :10x10 :10x10        Foam roller series 1' each 1' each 1' each 1' each 1' each        t-band rows held held held held held        t-band LT/ER held held held held held        Prone I's, T's, W's :10x10 each :10x10 each :10x10 each :10x10 each :10x10 each        Thoracic ext stretch EOT :10x10 :10x10 :10x10 :10x10 :10x10        First ribs mobs with towel home home home home home        Scalene stretch with towel home home home home home        Self ball massage home home home home home                                                                                                                                                                        Modalities  1/4 1/9 1/16 1/23 1/30        MHP pre-tx to cervical spine 10' 10' 10' 10' 10'                                    HEP: thoracic ext over 1/2 pillow f/b LTR